# Patient Record
Sex: FEMALE | Race: WHITE | URBAN - METROPOLITAN AREA
[De-identification: names, ages, dates, MRNs, and addresses within clinical notes are randomized per-mention and may not be internally consistent; named-entity substitution may affect disease eponyms.]

---

## 2023-07-10 ENCOUNTER — HOSPITAL ENCOUNTER (OUTPATIENT)
Age: 33
Setting detail: SPECIMEN
Discharge: HOME | End: 2023-07-10

## 2023-07-10 LAB
25(OH)D3 SERPL-MCNC: 85.3 NG/ML
ALBUMIN SERPL-MCNC: 4.5 G/DL (ref 3.5–5.2)
ALBUMIN/GLOB SERPL: 1.6 {RATIO} (ref 1–2.5)
ALP SERPL-CCNC: 51 U/L (ref 35–104)
ALT SERPL-CCNC: 16 U/L (ref 5–33)
ANION GAP SERPL CALCULATED.3IONS-SCNC: 13 MMOL/L (ref 9–17)
AST SERPL-CCNC: 19 U/L
BASOPHILS # BLD: 0.07 K/UL (ref 0–0.2)
BASOPHILS NFR BLD: 1 % (ref 0–2)
BILIRUB SERPL-MCNC: 0.6 MG/DL (ref 0.3–1.2)
BUN SERPL-MCNC: 12 MG/DL (ref 6–20)
CALCIUM SERPL-MCNC: 9.3 MG/DL (ref 8.6–10.4)
CHLORIDE SERPL-SCNC: 101 MMOL/L (ref 98–107)
CHOLEST SERPL-MCNC: 211 MG/DL
CHOLESTEROL/HDL RATIO: 4.7
CO2 SERPL-SCNC: 23 MMOL/L (ref 20–31)
CREAT SERPL-MCNC: 0.9 MG/DL (ref 0.5–0.9)
EOSINOPHIL # BLD: 0.12 K/UL (ref 0–0.44)
EOSINOPHILS RELATIVE PERCENT: 2 % (ref 1–4)
ERYTHROCYTE [DISTWIDTH] IN BLOOD BY AUTOMATED COUNT: 12.2 % (ref 11.8–14.4)
EST. AVERAGE GLUCOSE BLD GHB EST-MCNC: 100 MG/DL
FOLATE SERPL-MCNC: 18.8 NG/ML
GFR SERPL CREATININE-BSD FRML MDRD: >60 ML/MIN/1.73M2
GLUCOSE SERPL-MCNC: 85 MG/DL (ref 70–99)
HBA1C MFR BLD: 5.1 % (ref 4–6)
HCT VFR BLD AUTO: 43.9 % (ref 36.3–47.1)
HDLC SERPL-MCNC: 45 MG/DL
HGB BLD-MCNC: 14.9 G/DL (ref 11.9–15.1)
IMM GRANULOCYTES # BLD AUTO: <0.03 K/UL (ref 0–0.3)
IMM GRANULOCYTES NFR BLD: 0 %
LDLC SERPL CALC-MCNC: 133 MG/DL (ref 0–130)
LYMPHOCYTES # BLD: 36 % (ref 24–43)
LYMPHOCYTES NFR BLD: 2.34 K/UL (ref 1.1–3.7)
MCH RBC QN AUTO: 30.2 PG (ref 25.2–33.5)
MCHC RBC AUTO-ENTMCNC: 33.9 G/DL (ref 28.4–34.8)
MCV RBC AUTO: 88.9 FL (ref 82.6–102.9)
MONOCYTES NFR BLD: 0.63 K/UL (ref 0.1–1.2)
MONOCYTES NFR BLD: 10 % (ref 3–12)
NEUTROPHILS NFR BLD: 51 % (ref 36–65)
NEUTS SEG NFR BLD: 3.34 K/UL (ref 1.5–8.1)
NRBC BLD-RTO: 0 PER 100 WBC
PLATELET # BLD AUTO: 331 K/UL (ref 138–453)
PMV BLD AUTO: 10.8 FL (ref 8.1–13.5)
POTASSIUM SERPL-SCNC: 4.2 MMOL/L (ref 3.7–5.3)
PROT SERPL-MCNC: 7.3 G/DL (ref 6.4–8.3)
RBC # BLD AUTO: 4.94 M/UL (ref 3.95–5.11)
SODIUM SERPL-SCNC: 137 MMOL/L (ref 135–144)
TRIGL SERPL-MCNC: 164 MG/DL
TSH SERPL DL<=0.05 MIU/L-ACNC: 2.8 UIU/ML (ref 0.3–5)
VIT B12 SERPL-MCNC: 1126 PG/ML (ref 232–1245)
WBC OTHER # BLD: 6.5 K/UL (ref 3.5–11.3)

## 2023-12-28 ENCOUNTER — HOSPITAL ENCOUNTER (OUTPATIENT)
Age: 33
Setting detail: THERAPIES SERIES
Discharge: HOME | End: 2023-12-28
Payer: MEDICAID

## 2023-12-28 PROCEDURE — 92523 SPEECH SOUND LANG COMPREHEN: CPT

## 2023-12-28 NOTE — PROGRESS NOTES
Other: Vision [x] [facility] [] Other:   Hand Dominance  [] Right [] Left   Dentition  [] WFL [] Partials:  [] Dentures:  [] Other:          Prior Level of Function: Assist with ADL's; Dependent for IADL's  Current Level of Function: Same as PLOF  Lives with: parents  Living situation: lives in parents home  Education: n/a  Pt responsibilities: [] Medications [] Finances [] Driving [] Cooking  Employment:  Pt attends [employer] five days/week  Hobbies/Interests:        Tests Administered:  Informal measures    Objective: Auditory Comprehension:  []WNL  [x] Impaired:  Picture Identification: 46-30% accurate with field of 9-12  Body-Part Identification: 67%  Commands: 1-part: 100% with body-part ID; 25% for simple 1-part commands, independent improving to 100% with imitation  Yes/No Sentence Level: Functional yes/no?: 75-90% accurate  Yes/No Paragraph Level: NT  WH-? Simple: NT  Wh-? Complex: NT  Conversation:NT    Verbal Expression:  [] WNL [x] Impaired:   Able to Communicate Functional Needs: Pt's parents report that she is able to produce some single words such as \"eat, outside, school, bathroom, cereal\". She will also use some gestures. Repetition-Single Syllable Words: Pt observed to repeat a few words per her father's request  Naming Objects: NT  Naming Pictures:NT  Conversation: Pt did not initiate any verbal communication during today's evaluation. She did initiate some non-verbal communication (ie \"high five\"). Her parents also report that she if she is not feeling good, she will let her parents know and will point to the body part that is bothering her. Reading Comprehension: [] WNL  [] Not Tested [x]Impaired: Parents report pt is unable to read at a word level    Written Expression: [] WNL [x] Not Tested [] Impaired:     Cognitive-Linguistic Abilities: [] WNL [x] Impaired: Consistent with diagnosis of autism.   Pt was cooperative and followed simple commands as needed for today's

## 2024-02-29 ENCOUNTER — HOSPITAL ENCOUNTER (OUTPATIENT)
Age: 34
Setting detail: THERAPIES SERIES
Discharge: HOME | End: 2024-02-29
Payer: MEDICAID

## 2024-02-29 PROCEDURE — 92507 TX SP LANG VOICE COMM INDIV: CPT

## 2024-04-17 NOTE — FLOWSHEET NOTE
[] [facility]  Outpatient Rehabilitation &  Therapy  [address]  [phone]  F: [phone] [] [facility]  Outpatient Rehabilitation &  Therapy  [address]  P: [phone]  F: [phone] [x] [facility]  Outpatient Rehabilitation &  Therapy  [address]   P: [phone]  F: [phone] [] [facility] Outpatient Rehabilitation &  Therapy  [address]  P: [phone]  F: [phone]       Speech Therapy Daily Treatment Note      Date:  2024  Patient Name:  Siomara Maya    :  1990  MRN: [id number]  Physician: Dr. Dave Sweeney      Insurance:  Medicaid   Medical Diagnosis: F84.0 Autistic disorder     Rehab Codes: : R41.841 Cognitive-Communication Deficit    Onset Date: 1990        Next  Appt: unknown  Visit# / total visits: 1/10; 1 Progress note for patient due at visit 10     Cancels/No Shows: 0/0    Subjective:  Pt is in good spirits. In attendance for ST session were pt, her mother and father, and Sravanthi, who is the representative for [employer]. The purpose of today's session was to consult with pt and family to obtain a new AAC device, as her previous device no longer works.  Pain:  [] Yes  [x] No Location:  N/A  Pain Rating: (0-10 scale) 0/10  Pain altered Tx:  [] No  [] Yes  Action:  Comments:    Precautions: Autism, nonverbal, explosive personality disorder     Objective:    LTG: Obtain replacement communication device.   Sravanthi, the AAC consultant present for duration of session, introduced and demonstrated use of three different AAC devices: Accent 800, Accent 1000, and Via Pro. The devices utilized LAMP [employer] for [employer], which is also the sujatha pt used on her previous device. Pt demonstrated independent use of the Accent 1000 device and LAMP to locate icons for the following: water, wait. Pt was also able to independently show a 4-sequence of icons  No Refill Protocol on File:    Medication/Dose: tramadol  Patient last seen by PCP: 2024  Next office visit with PCP: 10/11/2024  Last Lab:2023 UDS  Last Ordered: 2024    Above information requires contacting patient: No    PDMP needs to be reviewed by Provider  Hydrocodone-Acetaminophen  5-325MG / Tablet  Rx# [id number] Opioid Qty: 90  Days: 30  Refills: 0 Prescribed: 3/25/2024  Dispensed: 3/25/2024  Sold: 3/25/2024 [id number] - [place], [place] - [address]  [address] [facility] [address] Justine Manzano  : 1962 [id number] [place] [address] DR EMELY MELGAR [id number]  Pay Type: Commercial Insurance   Alprazolam  2MG / Tablet  Rx# [id number] Benzodiazepine Qty: 75  Days: 30  Refills: 5 Prescribed: 3/25/2024  Dispensed: 3/25/2024  Sold: 3/26/2024 [id number] - [place], [place] - [address]  [address] [facility] [address] Justine Manzano  : [phone] [place] [address] DR EMELY MELGAR [id number]  Pay Type: Commercial Insurance   Gabapentin  100MG / Capsule  Rx# [id number] Other Qty: 60  Days: 30  Refills: 1 Prescribed: 2023  Dispensed: 3/19/2024  Sold: 3/22/2024 [id number] - [place], [place] - [address]  [address] [facility] [address] Justine Manzano  : [phone] [place] [address] DR EMELY MELGAR [id number]  Pay Type: Commercial Insurance   Tramadol HCl  50MG / Tablet  Rx# [id number] Opioid Qty: 90  Days: 30  Refills: 0 Prescribed: 3/18/2024  Dispensed: 3/18/2024  Sold: 3/19/2024 [id number] - [place], [place] - [address]  [address] [facility] [address] Justine Manzano  : 1962 [address] [address] [address]  Pay Type: Commercial Insurance       Sent to provider to approve or deny

## 2024-09-09 PROBLEM — N94.3 PMS (PREMENSTRUAL SYNDROME): Status: ACTIVE | Noted: 2023-06-27

## 2024-09-09 PROBLEM — M99.08 SEGMENTAL AND SOMATIC DYSFUNCTION OF RIB CAGE: Status: ACTIVE | Noted: 2022-06-08

## 2024-09-09 PROBLEM — R47.9 UNSPECIFIED SPEECH DISTURBANCES: Status: ACTIVE | Noted: 2022-06-08

## 2024-09-09 PROBLEM — M99.00 SEGMENTAL AND SOMATIC DYSFUNCTION OF HEAD REGION: Status: ACTIVE | Noted: 2022-06-08

## 2024-09-09 PROBLEM — F84.0 AUTISTIC DISORDER: Status: ACTIVE | Noted: 2021-10-06

## 2024-09-09 PROBLEM — M99.07 SEGMENTAL AND SOMATIC DYSFUNCTION OF UPPER EXTREMITY: Status: ACTIVE | Noted: 2022-06-08

## 2024-09-09 PROBLEM — R46.89 AGGRESSIVE BEHAVIOR: Status: ACTIVE | Noted: 2018-02-13

## 2024-09-09 PROBLEM — L40.9 PSORIASIS, UNSPECIFIED: Status: ACTIVE | Noted: 2022-06-08

## 2024-09-09 PROBLEM — R63.4 ABNORMAL WEIGHT LOSS: Status: ACTIVE | Noted: 2022-06-08

## 2024-09-09 PROBLEM — F72 SEVERE INTELLECTUAL DISABILITY: Status: ACTIVE | Noted: 2023-06-27

## 2024-09-09 RX ORDER — ARIPIPRAZOLE 5 MG/1
5 TABLET ORAL DAILY
COMMUNITY
Start: 2020-09-20

## 2024-09-09 RX ORDER — DROSPIRENONE AND ESTETROL 3-14.2(28)
1 KIT ORAL DAILY
COMMUNITY
Start: 2023-06-23 | End: 2024-09-16

## 2024-09-12 RX ORDER — MULTIVITAMIN WITH IRON
1 TABLET ORAL DAILY
COMMUNITY

## 2024-09-16 ENCOUNTER — OFFICE VISIT (OUTPATIENT)
Age: 34
End: 2024-09-16

## 2024-09-16 VITALS
SYSTOLIC BLOOD PRESSURE: 102 MMHG | WEIGHT: 163 LBS | BODY MASS INDEX: 27.16 KG/M2 | HEIGHT: 65 IN | DIASTOLIC BLOOD PRESSURE: 72 MMHG | OXYGEN SATURATION: 98 % | HEART RATE: 68 BPM

## 2024-09-16 DIAGNOSIS — N39.46 MIXED STRESS AND URGE URINARY INCONTINENCE: Primary | ICD-10-CM

## 2024-09-16 DIAGNOSIS — R32 URINARY INCONTINENCE, UNSPECIFIED TYPE: ICD-10-CM

## 2024-09-16 DIAGNOSIS — R33.9 RETENTION OF URINE: ICD-10-CM

## 2024-09-16 LAB
BILIRUBIN, POC: ABNORMAL
BLOOD URINE, POC: ABNORMAL
CLARITY, POC: CLEAR
COLOR, POC: YELLOW
GLUCOSE URINE, POC: ABNORMAL MG/DL
KETONES, POC: ABNORMAL MG/DL
LEUKOCYTE EST, POC: 500
NITRITE, POC: ABNORMAL
PH, POC: 7
PROTEIN, POC: ABNORMAL MG/DL
SPECIFIC GRAVITY, POC: 1.01
UROBILINOGEN, POC: ABNORMAL MG/DL

## 2024-09-16 RX ORDER — TROSPIUM CHLORIDE 20 MG/1
20 TABLET, FILM COATED ORAL DAILY
Qty: 30 TABLET | Refills: 5 | Status: SHIPPED | OUTPATIENT
Start: 2024-09-16

## 2024-09-17 ENCOUNTER — TELEPHONE (OUTPATIENT)
Age: 34
End: 2024-09-17